# Patient Record
Sex: MALE | Race: WHITE | NOT HISPANIC OR LATINO | Employment: FULL TIME | URBAN - METROPOLITAN AREA
[De-identification: names, ages, dates, MRNs, and addresses within clinical notes are randomized per-mention and may not be internally consistent; named-entity substitution may affect disease eponyms.]

---

## 2024-01-16 ENCOUNTER — HOSPITAL ENCOUNTER (EMERGENCY)
Facility: HOSPITAL | Age: 54
Discharge: HOME | End: 2024-01-16
Payer: OTHER MISCELLANEOUS

## 2024-01-16 VITALS
SYSTOLIC BLOOD PRESSURE: 141 MMHG | HEIGHT: 67 IN | OXYGEN SATURATION: 98 % | BODY MASS INDEX: 31.39 KG/M2 | HEART RATE: 61 BPM | WEIGHT: 200 LBS | DIASTOLIC BLOOD PRESSURE: 84 MMHG | RESPIRATION RATE: 16 BRPM

## 2024-01-16 DIAGNOSIS — S05.02XA ABRASION OF LEFT CORNEA, INITIAL ENCOUNTER: Primary | ICD-10-CM

## 2024-01-16 DIAGNOSIS — H57.89 IRRITATION OF LEFT EYE: ICD-10-CM

## 2024-01-16 PROCEDURE — 2500000001 HC RX 250 WO HCPCS SELF ADMINISTERED DRUGS (ALT 637 FOR MEDICARE OP): Performed by: STUDENT IN AN ORGANIZED HEALTH CARE EDUCATION/TRAINING PROGRAM

## 2024-01-16 PROCEDURE — 99283 EMERGENCY DEPT VISIT LOW MDM: CPT

## 2024-01-16 RX ORDER — TETRACAINE HYDROCHLORIDE 5 MG/ML
1 SOLUTION OPHTHALMIC ONCE
Status: COMPLETED | OUTPATIENT
Start: 2024-01-16 | End: 2024-01-16

## 2024-01-16 RX ORDER — ERYTHROMYCIN 5 MG/G
OINTMENT OPHTHALMIC NIGHTLY
Qty: 3.5 G | Refills: 0 | Status: SHIPPED | OUTPATIENT
Start: 2024-01-16 | End: 2024-01-26

## 2024-01-16 RX ORDER — ACETAMINOPHEN 325 MG/1
975 TABLET ORAL ONCE
Status: COMPLETED | OUTPATIENT
Start: 2024-01-16 | End: 2024-01-16

## 2024-01-16 RX ORDER — ERYTHROMYCIN 5 MG/G
1 OINTMENT OPHTHALMIC NIGHTLY
Status: DISCONTINUED | OUTPATIENT
Start: 2024-01-16 | End: 2024-01-16 | Stop reason: HOSPADM

## 2024-01-16 RX ADMIN — ACETAMINOPHEN 975 MG: 325 TABLET ORAL at 21:28

## 2024-01-16 RX ADMIN — PURIFIED WATER: 986 SOLUTION OPHTHALMIC at 21:28

## 2024-01-16 RX ADMIN — TETRACAINE HYDROCHLORIDE 1 DROP: 5 SOLUTION OPHTHALMIC at 21:25

## 2024-01-16 RX ADMIN — FLUORESCEIN SODIUM 1 STRIP: 1 STRIP OPHTHALMIC at 21:28

## 2024-01-16 RX ADMIN — ERYTHROMYCIN 1 CM: 5 OINTMENT OPHTHALMIC at 21:50

## 2024-01-16 ASSESSMENT — PAIN SCALES - GENERAL
PAINLEVEL_OUTOF10: 10 - WORST POSSIBLE PAIN
PAINLEVEL_OUTOF10: 0 - NO PAIN

## 2024-01-16 ASSESSMENT — VISUAL ACUITY
OD: 20/30
OS: 20/50
OU: 20/20

## 2024-01-16 ASSESSMENT — COLUMBIA-SUICIDE SEVERITY RATING SCALE - C-SSRS
6. HAVE YOU EVER DONE ANYTHING, STARTED TO DO ANYTHING, OR PREPARED TO DO ANYTHING TO END YOUR LIFE?: NO
2. HAVE YOU ACTUALLY HAD ANY THOUGHTS OF KILLING YOURSELF?: NO
1. IN THE PAST MONTH, HAVE YOU WISHED YOU WERE DEAD OR WISHED YOU COULD GO TO SLEEP AND NOT WAKE UP?: NO

## 2024-01-16 ASSESSMENT — PAIN - FUNCTIONAL ASSESSMENT: PAIN_FUNCTIONAL_ASSESSMENT: 0-10

## 2024-01-17 NOTE — ED PROVIDER NOTES
HPI   Chief Complaint   Patient presents with    Eye Pain       Patient is a 53-year-old male with no significant past medical history presenting with left eye irritation.  He was at work earlier today working with a coated wire when he caught 1 and it flew under his safety glasses, hitting him in the eye.  States that as he was that happened, he had cloudy vision but that has since resolved.  Does endorse a moderate headache since the incident.  Currently denies blurry vision, double vision, fevers, chills, chest pain, shortness of breath, abdominal pain, nausea, vomiting, diarrhea.  Patient does wear glasses.                          Mihai Coma Scale Score: 15                  Patient History   Past Medical History:   Diagnosis Date    Injury of digital nerve of unspecified finger, initial encounter 02/29/2020    Laceration of digital nerve of finger, initial encounter    Laceration of flexor muscle, fascia and tendon of unspecified finger at forearm level, initial encounter 02/27/2020    Flexor tendon laceration of finger with open wound, initial encounter     History reviewed. No pertinent surgical history.  No family history on file.  Social History     Tobacco Use    Smoking status: Not on file    Smokeless tobacco: Not on file   Substance Use Topics    Alcohol use: Not on file    Drug use: Not on file       Physical Exam   ED Triage Vitals [01/16/24 2046]   Temp Heart Rate Resp BP   -- 63 14 147/87      SpO2 Temp src Heart Rate Source Patient Position   97 % -- Monitor Sitting      BP Location FiO2 (%)     Left arm --       Physical Exam  Constitutional:       Appearance: Normal appearance.      Comments: Awake, sitting in examination chair   HENT:      Head: Normocephalic and atraumatic.      Nose: Nose normal.      Mouth/Throat:      Mouth: Mucous membranes are moist.      Pharynx: Oropharynx is clear.   Eyes:      General: Lids are normal. No allergic shiner.        Right eye: No foreign body.          Left eye: No foreign body.      Extraocular Movements: Extraocular movements intact.      Right eye: Normal extraocular motion.      Left eye: Normal extraocular motion.      Conjunctiva/sclera: Conjunctivae normal.      Right eye: Right conjunctiva is not injected.      Left eye: Left conjunctiva is not injected.      Pupils: Pupils are equal, round, and reactive to light.   Cardiovascular:      Rate and Rhythm: Normal rate and regular rhythm.      Pulses: Normal pulses.      Heart sounds: Normal heart sounds.   Pulmonary:      Effort: Pulmonary effort is normal.      Breath sounds: Normal breath sounds.   Abdominal:      General: Abdomen is flat.      Palpations: Abdomen is soft.   Musculoskeletal:      Cervical back: Normal range of motion and neck supple.   Skin:     General: Skin is warm and dry.   Neurological:      General: No focal deficit present.      Mental Status: He is alert and oriented to person, place, and time.   Psychiatric:         Mood and Affect: Mood normal.         Behavior: Behavior normal.         ED Course & MDM   Diagnoses as of 01/16/24 2147   Abrasion of left cornea, initial encounter   Irritation of left eye       Medical Decision Making  Patient is a 53-year-old male with no significant past medical history presenting with left eye irritation.  Conditions considered include but are not limited to: Foreign body, corneal abrasion, globe rupture, conjunctivitis.  Patient's left eye will be irrigated.  Tetracaine will be administered with fluorescein stain to check for corneal abrasion with Woods lamp.  Eye pH to be checked.  Visual acuity was 20/30 in the right eye, and 20/50 in the left eye.  It was 20/20 with bilateral.  Woods lamp and fluorescein stain did show corneal abrasion on the lateral aspect of left eye.  No foreign body noted on initial examination.  No foreign body noted during was able to admission.  No globe rupture present.  No pain with extraocular eye movements.  No rust  ring present.  Eye pH within normal limits.  Tylenol given for headache.    I believe this patient is at low risk for complication, and a disposition of discharge is acceptable.  Return to the Emergency Department if new or worsening symptoms including fever, chills, double vision, blurry vision, vision loss, purulent eye discharge, chest pain, shortness of breath, syncope, near syncope, abdominal pain, nausea, vomiting, or diarrhea.  Discussed with the patient to follow-up with ophthalmology for further management of corneal abrasion.  Erythromycin ointment will be administered in the emergency department and a prescription will be sent home for patient.  Discussed applying daily until tube is empty.  Also discussed with patient that he can patch the eye as needed for comfort.  Patient is agreeable to a disposition of discharge with follow-up with ophthalmology as well as to take antibiotics as prescribed.    Portions of this note made with Dragon software, please be mindful of potential grammatical errors.        Medications   erythromycin (Romycin) 5 mg/gram (0.5 %) ophthalmic ointment 1 cm (1 cm Left Eye Given 1/16/24 2150)   tetracaine (Altacaine) 0.5 % ophthalmic solution 1 drop (1 drop Left Eye Given 1/16/24 2125)   eye wash irrigation solution irrigation solution ( Left Eye Given 1/16/24 2128)   fluorescein 1 mg ophthalmic strip 1 strip (1 strip Left Eye Given 1/16/24 2128)   acetaminophen (Tylenol) tablet 975 mg (975 mg oral Given 1/16/24 2128)         Procedure  Procedures     Ishan Kraft PA-C  01/16/24 2154

## 2024-01-17 NOTE — ED TRIAGE NOTES
Pt presents to the ED with c/c of LEFT eye injury. Pt states he was at work and hit in the eye with a wire. Pt states the wire was coated in 3 different materials.